# Patient Record
Sex: MALE | Race: WHITE | NOT HISPANIC OR LATINO | Employment: OTHER | URBAN - METROPOLITAN AREA
[De-identification: names, ages, dates, MRNs, and addresses within clinical notes are randomized per-mention and may not be internally consistent; named-entity substitution may affect disease eponyms.]

---

## 2017-05-24 ENCOUNTER — HISTORICAL (OUTPATIENT)
Dept: LAB | Facility: HOSPITAL | Age: 62
End: 2017-05-24

## 2017-05-24 LAB
BUN SERPL-MCNC: 16 MG/DL (ref 10–20)
CALCIUM SERPL-MCNC: 9.2 MG/DL (ref 8–10.5)
CHLORIDE SERPL-SCNC: 107 MMOL/L (ref 100–108)
CO2 SERPL-SCNC: 27 MMOL/L (ref 21–35)
CREAT SERPL-MCNC: 1.01 MG/DL (ref 0.7–1.3)
EST. AVERAGE GLUCOSE BLD GHB EST-MCNC: 128 MG/DL
GLUCOSE 1H P 100 G GLC PO SERPL-MCNC: 174 MG/DL
GLUCOSE 2H P 100 G GLC PO SERPL-MCNC: 123 MG/DL
GLUCOSE 30M P 100 G GLC PO SERPL-MCNC: 178 MG/DL
GLUCOSE SERPL-MCNC: 106 MG/DL (ref 75–116)
HBA1C MFR BLD: 6.1 % (ref 4.8–6)
POTASSIUM SERPL-SCNC: 4.6 MMOL/L (ref 3.6–5.2)
SODIUM SERPL-SCNC: 142 MMOL/L (ref 135–145)

## 2017-05-25 LAB — GLUCOSE 2H P GLC SERPL-MCNC: 123 MG/DL

## 2017-11-17 ENCOUNTER — HISTORICAL (OUTPATIENT)
Dept: LAB | Facility: HOSPITAL | Age: 62
End: 2017-11-17

## 2017-11-17 LAB
ALBUMIN SERPL-MCNC: 3.8 GM/DL (ref 3.4–5)
ALBUMIN/GLOB SERPL: 1.2 RATIO
ALP SERPL-CCNC: 112 UNIT/L (ref 30–113)
ALT SERPL-CCNC: 38 UNIT/L (ref 10–45)
AST SERPL-CCNC: 28 UNIT/L (ref 15–37)
BILIRUB SERPL-MCNC: 0.3 MG/DL (ref 0.1–0.9)
BILIRUBIN DIRECT+TOT PNL SERPL-MCNC: 0.1 MG/DL (ref 0–0.3)
BILIRUBIN DIRECT+TOT PNL SERPL-MCNC: 0.2 MG/DL
BUN SERPL-MCNC: 16 MG/DL (ref 10–20)
CALCIUM SERPL-MCNC: 8.9 MG/DL (ref 8–10.5)
CHLORIDE SERPL-SCNC: 106 MMOL/L (ref 100–108)
CHOLEST SERPL-MCNC: 160 MG/DL (ref 140–200)
CHOLEST/HDLC SERPL: 2 MG/DL (ref 0–8)
CO2 SERPL-SCNC: 26 MMOL/L (ref 21–35)
CREAT SERPL-MCNC: 1.01 MG/DL (ref 0.7–1.3)
EST. AVERAGE GLUCOSE BLD GHB EST-MCNC: 131 MG/DL
GLOBULIN SER-MCNC: 3.3 GM/DL
GLUCOSE SERPL-MCNC: 112 MG/DL (ref 75–116)
HBA1C MFR BLD: 6.2 % (ref 4.8–6)
HDLC SERPL-MCNC: 64 MG/DL (ref 35–59)
LDLC SERPL CALC-MCNC: 86 MG/DL (ref 100–129)
POTASSIUM SERPL-SCNC: 4.4 MMOL/L (ref 3.6–5.2)
PROT SERPL-MCNC: 7.1 GM/DL (ref 6.4–8.2)
SODIUM SERPL-SCNC: 140 MMOL/L (ref 135–145)
TRIGL SERPL-MCNC: 101 MG/DL (ref 35–150)
URATE SERPL-MCNC: 4.6 MG/DL (ref 2.6–7.2)
VLDLC SERPL CALC-MCNC: 20 MG/DL

## 2018-02-15 ENCOUNTER — HISTORICAL (OUTPATIENT)
Dept: RADIOLOGY | Facility: HOSPITAL | Age: 63
End: 2018-02-15

## 2019-08-02 ENCOUNTER — HISTORICAL (OUTPATIENT)
Dept: RADIOLOGY | Facility: HOSPITAL | Age: 64
End: 2019-08-02

## 2019-11-26 ENCOUNTER — HISTORICAL (OUTPATIENT)
Dept: LAB | Facility: HOSPITAL | Age: 64
End: 2019-11-26

## 2019-11-26 LAB
ALBUMIN SERPL-MCNC: 3.9 GM/DL (ref 3.4–5)
ALBUMIN/GLOB SERPL: 1.1 RATIO
ALP SERPL-CCNC: 87 UNIT/L (ref 30–113)
ALT SERPL-CCNC: 31 UNIT/L (ref 10–45)
AST SERPL-CCNC: 23 UNIT/L (ref 15–37)
BILIRUB SERPL-MCNC: 0.4 MG/DL (ref 0.1–0.9)
BILIRUBIN DIRECT+TOT PNL SERPL-MCNC: 0.1 MG/DL (ref 0–0.3)
BILIRUBIN DIRECT+TOT PNL SERPL-MCNC: 0.3 MG/DL
BUN SERPL-MCNC: 14 MG/DL (ref 10–20)
CALCIUM SERPL-MCNC: 9.4 MG/DL (ref 8–10.5)
CHLORIDE SERPL-SCNC: 106 MMOL/L (ref 100–108)
CHOLEST SERPL-MCNC: 180 MG/DL (ref 140–200)
CHOLEST/HDLC SERPL: 2 MG/DL (ref 0–8)
CO2 SERPL-SCNC: 30 MMOL/L (ref 21–35)
CREAT SERPL-MCNC: 0.96 MG/DL (ref 0.7–1.3)
EST. AVERAGE GLUCOSE BLD GHB EST-MCNC: 134 MG/DL
GLOBULIN SER-MCNC: 3.4 GM/DL
GLUCOSE SERPL-MCNC: 103 MG/DL (ref 75–116)
HBA1C MFR BLD: 6.3 % (ref 4.8–6)
HDLC SERPL-MCNC: 87 MG/DL (ref 35–59)
LDLC SERPL CALC-MCNC: 88 MG/DL (ref 100–129)
POTASSIUM SERPL-SCNC: 4.4 MMOL/L (ref 3.6–5.2)
PROT SERPL-MCNC: 7.3 GM/DL (ref 6.4–8.2)
SODIUM SERPL-SCNC: 142 MMOL/L (ref 135–145)
TRIGL SERPL-MCNC: 93 MG/DL (ref 35–150)
TSH SERPL-ACNC: 1.41 MIU/ML (ref 0.36–3.74)
URATE SERPL-MCNC: 4.3 MG/DL (ref 2.6–7.2)
VLDLC SERPL CALC-MCNC: 19 MG/DL

## 2019-12-30 ENCOUNTER — HISTORICAL (OUTPATIENT)
Dept: RADIOLOGY | Facility: HOSPITAL | Age: 64
End: 2019-12-30

## 2020-02-20 ENCOUNTER — HISTORICAL (OUTPATIENT)
Dept: RADIOLOGY | Facility: HOSPITAL | Age: 65
End: 2020-02-20

## 2020-05-21 ENCOUNTER — HISTORICAL (OUTPATIENT)
Dept: LAB | Facility: HOSPITAL | Age: 65
End: 2020-05-21

## 2020-05-21 LAB
ABS NEUT (OLG): 3.9 X10(3)/MCL (ref 1.5–6.9)
BASOPHILS # BLD AUTO: 0 X10(3)/MCL (ref 0–0.1)
BASOPHILS NFR BLD AUTO: 0 % (ref 0–1)
EOSINOPHIL # BLD AUTO: 0.3 X10(3)/MCL (ref 0–0.6)
EOSINOPHIL NFR BLD AUTO: 4 % (ref 0–5)
ERYTHROCYTE [DISTWIDTH] IN BLOOD BY AUTOMATED COUNT: 14.4 % (ref 11.5–17)
HCT VFR BLD AUTO: 37.8 % (ref 42–52)
HGB BLD-MCNC: 12.1 GM/DL (ref 14–18)
IMM GRANULOCYTES # BLD AUTO: 0.03 10*3/UL (ref 0–0.02)
IMM GRANULOCYTES NFR BLD AUTO: 0.4 % (ref 0–0.43)
LYMPHOCYTES # BLD AUTO: 2.2 X10(3)/MCL (ref 0.5–4.1)
LYMPHOCYTES NFR BLD AUTO: 31 % (ref 15–40)
MCH RBC QN AUTO: 31 PG (ref 27–34)
MCHC RBC AUTO-ENTMCNC: 32 GM/DL (ref 31–36)
MCV RBC AUTO: 98 FL (ref 80–99)
MONOCYTES # BLD AUTO: 0.6 X10(3)/MCL (ref 0–1.1)
MONOCYTES NFR BLD AUTO: 9 % (ref 4–12)
NEUTROPHILS # BLD AUTO: 3.9 X10(3)/MCL (ref 1.5–6.9)
NEUTROPHILS NFR BLD AUTO: 55 % (ref 43–75)
PLATELET # BLD AUTO: 274 X10(3)/MCL (ref 140–400)
PMV BLD AUTO: 10.6 FL (ref 6.8–10)
RBC # BLD AUTO: 3.87 X10(6)/MCL (ref 4.7–6.1)
WBC # SPEC AUTO: 7 X10(3)/MCL (ref 4.5–11.5)

## 2020-07-06 ENCOUNTER — HISTORICAL (OUTPATIENT)
Dept: LAB | Facility: HOSPITAL | Age: 65
End: 2020-07-06

## 2020-08-20 ENCOUNTER — HISTORICAL (OUTPATIENT)
Dept: RESPIRATORY THERAPY | Facility: HOSPITAL | Age: 65
End: 2020-08-20

## 2020-09-11 ENCOUNTER — HISTORICAL (OUTPATIENT)
Dept: LAB | Facility: HOSPITAL | Age: 65
End: 2020-09-11

## 2020-09-11 LAB
ABS NEUT (OLG): 3.4 X10(3)/MCL (ref 1.5–6.9)
BASOPHILS # BLD AUTO: 0 X10(3)/MCL (ref 0–0.1)
BASOPHILS NFR BLD AUTO: 1 % (ref 0–1)
EOSINOPHIL # BLD AUTO: 0.6 X10(3)/MCL (ref 0–0.6)
EOSINOPHIL NFR BLD AUTO: 9 % (ref 0–5)
ERYTHROCYTE [DISTWIDTH] IN BLOOD BY AUTOMATED COUNT: 13.7 % (ref 11.5–17)
HCT VFR BLD AUTO: 42.6 % (ref 42–52)
HGB BLD-MCNC: 14.3 GM/DL (ref 14–18)
IMM GRANULOCYTES # BLD AUTO: 0.02 10*3/UL (ref 0–0.02)
IMM GRANULOCYTES NFR BLD AUTO: 0.3 % (ref 0–0.43)
LYMPHOCYTES # BLD AUTO: 1.9 X10(3)/MCL (ref 0.5–4.1)
LYMPHOCYTES NFR BLD AUTO: 29 % (ref 15–40)
MCH RBC QN AUTO: 31 PG (ref 27–34)
MCHC RBC AUTO-ENTMCNC: 34 GM/DL (ref 31–36)
MCV RBC AUTO: 92 FL (ref 80–99)
MONOCYTES # BLD AUTO: 0.5 X10(3)/MCL (ref 0–1.1)
MONOCYTES NFR BLD AUTO: 8 % (ref 4–12)
NEUTROPHILS # BLD AUTO: 3.4 X10(3)/MCL (ref 1.5–6.9)
NEUTROPHILS NFR BLD AUTO: 53 % (ref 43–75)
PLATELET # BLD AUTO: 251 X10(3)/MCL (ref 140–400)
PMV BLD AUTO: 10.8 FL (ref 6.8–10)
RBC # BLD AUTO: 4.65 X10(6)/MCL (ref 4.7–6.1)
WBC # SPEC AUTO: 6.5 X10(3)/MCL (ref 4.5–11.5)

## 2020-11-24 ENCOUNTER — HISTORICAL (OUTPATIENT)
Dept: LAB | Facility: HOSPITAL | Age: 65
End: 2020-11-24

## 2020-11-24 LAB
ALBUMIN SERPL-MCNC: 4 GM/DL (ref 3.4–4.8)
ALBUMIN/GLOB SERPL: 1.2 RATIO (ref 1.1–2)
ALP SERPL-CCNC: 104 UNIT/L (ref 40–150)
ALT SERPL-CCNC: 25 UNIT/L (ref 0–55)
AST SERPL-CCNC: 23 UNIT/L (ref 5–34)
BILIRUB SERPL-MCNC: 0.7 MG/DL
BILIRUBIN DIRECT+TOT PNL SERPL-MCNC: 0.2 MG/DL (ref 0–0.5)
BILIRUBIN DIRECT+TOT PNL SERPL-MCNC: 0.5 MG/DL (ref 0–0.8)
BUN SERPL-MCNC: 17 MG/DL (ref 8.4–25.7)
CALCIUM SERPL-MCNC: 9.6 MG/DL (ref 8.8–10)
CHLORIDE SERPL-SCNC: 104 MMOL/L (ref 98–107)
CHOLEST SERPL-MCNC: 210 MG/DL
CHOLEST/HDLC SERPL: 3 {RATIO} (ref 0–5)
CO2 SERPL-SCNC: 30 MMOL/L (ref 23–31)
CREAT SERPL-MCNC: 0.9 MG/DL (ref 0.73–1.18)
EST. AVERAGE GLUCOSE BLD GHB EST-MCNC: 122.6 MG/DL
GLOBULIN SER-MCNC: 3.4 GM/DL (ref 2.4–3.5)
GLUCOSE SERPL-MCNC: 107 MG/DL (ref 82–115)
HBA1C MFR BLD: 5.9 %
HDLC SERPL-MCNC: 65 MG/DL (ref 35–60)
LDLC SERPL CALC-MCNC: 111 MG/DL (ref 50–140)
POTASSIUM SERPL-SCNC: 4.7 MMOL/L (ref 3.5–5.1)
PROT SERPL-MCNC: 7.4 GM/DL (ref 5.8–7.6)
SODIUM SERPL-SCNC: 143 MMOL/L (ref 136–145)
TRIGL SERPL-MCNC: 171 MG/DL (ref 34–140)
TSH SERPL-ACNC: 0.58 UIU/ML (ref 0.35–4.94)
URATE SERPL-MCNC: 5.1 MG/DL (ref 3.5–7.2)
VLDLC SERPL CALC-MCNC: 34 MG/DL

## 2021-09-24 ENCOUNTER — HISTORICAL (OUTPATIENT)
Dept: LAB | Facility: HOSPITAL | Age: 66
End: 2021-09-24

## 2021-09-24 LAB — SARS-COV-2 AG RESP QL IA.RAPID: NOT DETECTED

## 2021-10-07 ENCOUNTER — HISTORICAL (OUTPATIENT)
Dept: LAB | Facility: HOSPITAL | Age: 66
End: 2021-10-07

## 2021-10-07 LAB
ABS NEUT (OLG): 4.5 X10(3)/MCL (ref 1.5–6.9)
BASOPHILS # BLD AUTO: 0 X10(3)/MCL (ref 0–0.1)
BASOPHILS NFR BLD AUTO: 0 % (ref 0–1)
EOSINOPHIL # BLD AUTO: 0 X10(3)/MCL (ref 0–0.6)
EOSINOPHIL NFR BLD AUTO: 0 % (ref 0–5)
ERYTHROCYTE [DISTWIDTH] IN BLOOD BY AUTOMATED COUNT: 13.8 % (ref 11.5–17)
HCT VFR BLD AUTO: 42.7 % (ref 42–52)
HGB BLD-MCNC: 14.1 GM/DL (ref 14–18)
IMM GRANULOCYTES # BLD AUTO: 0.03 10*3/UL (ref 0–0.02)
IMM GRANULOCYTES NFR BLD AUTO: 0.4 % (ref 0–0.43)
LYMPHOCYTES # BLD AUTO: 2.4 X10(3)/MCL (ref 0.5–4.1)
LYMPHOCYTES NFR BLD AUTO: 31 % (ref 15–40)
MCH RBC QN AUTO: 31 PG (ref 27–34)
MCHC RBC AUTO-ENTMCNC: 33 GM/DL (ref 31–36)
MCV RBC AUTO: 94 FL (ref 80–99)
MONOCYTES # BLD AUTO: 0.7 X10(3)/MCL (ref 0–1.1)
MONOCYTES NFR BLD AUTO: 9 % (ref 4–12)
NEUTROPHILS # BLD AUTO: 4.5 X10(3)/MCL (ref 1.5–6.9)
NEUTROPHILS NFR BLD AUTO: 59 % (ref 43–75)
PLATELET # BLD AUTO: 247 X10(3)/MCL (ref 140–400)
PMV BLD AUTO: 10.8 FL (ref 6.8–10)
RBC # BLD AUTO: 4.55 X10(6)/MCL (ref 4.7–6.1)
WBC # SPEC AUTO: 7.6 X10(3)/MCL (ref 4.5–11.5)

## 2021-12-03 ENCOUNTER — HISTORICAL (OUTPATIENT)
Dept: LAB | Facility: HOSPITAL | Age: 66
End: 2021-12-03

## 2021-12-03 LAB
ALBUMIN SERPL-MCNC: 4 GM/DL (ref 3.4–4.8)
ALBUMIN/GLOB SERPL: 1.4 RATIO (ref 1.1–2)
ALP SERPL-CCNC: 109 UNIT/L (ref 40–150)
ALT SERPL-CCNC: 28 UNIT/L (ref 0–55)
AST SERPL-CCNC: 26 UNIT/L (ref 5–34)
BILIRUB SERPL-MCNC: 0.4 MG/DL
BILIRUBIN DIRECT+TOT PNL SERPL-MCNC: 0.2 MG/DL (ref 0–0.5)
BILIRUBIN DIRECT+TOT PNL SERPL-MCNC: 0.2 MG/DL (ref 0–0.8)
BUN SERPL-MCNC: 18 MG/DL (ref 8.4–25.7)
CALCIUM SERPL-MCNC: 9.3 MG/DL (ref 8.7–10.5)
CHLORIDE SERPL-SCNC: 107 MMOL/L (ref 98–107)
CHOLEST SERPL-MCNC: 180 MG/DL
CHOLEST/HDLC SERPL: 3 {RATIO} (ref 0–5)
CO2 SERPL-SCNC: 27 MMOL/L (ref 23–31)
CREAT SERPL-MCNC: 0.93 MG/DL (ref 0.73–1.18)
EST. AVERAGE GLUCOSE BLD GHB EST-MCNC: 125.5 MG/DL
GLOBULIN SER-MCNC: 2.8 GM/DL (ref 2.4–3.5)
GLUCOSE SERPL-MCNC: 119 MG/DL (ref 82–115)
HBA1C MFR BLD: 6 %
HDLC SERPL-MCNC: 54 MG/DL (ref 35–60)
LDLC SERPL CALC-MCNC: 106 MG/DL (ref 50–140)
POTASSIUM SERPL-SCNC: 5.2 MMOL/L (ref 3.5–5.1)
PROT SERPL-MCNC: 6.8 GM/DL (ref 5.8–7.6)
SODIUM SERPL-SCNC: 141 MMOL/L (ref 136–145)
TRIGL SERPL-MCNC: 102 MG/DL (ref 34–140)
URATE SERPL-MCNC: 5 MG/DL (ref 3.5–7.2)
VLDLC SERPL CALC-MCNC: 20 MG/DL

## 2022-02-08 ENCOUNTER — HISTORICAL (OUTPATIENT)
Dept: INFECTIOUS DISEASES | Facility: HOSPITAL | Age: 67
End: 2022-02-08

## 2022-03-30 ENCOUNTER — HISTORICAL (OUTPATIENT)
Dept: ADMINISTRATIVE | Facility: HOSPITAL | Age: 67
End: 2022-03-30

## 2022-03-30 ENCOUNTER — HISTORICAL (OUTPATIENT)
Dept: LAB | Facility: HOSPITAL | Age: 67
End: 2022-03-30

## 2022-03-30 LAB
CRP SERPL-MCNC: 0.35 MG/L
ERYTHROCYTE [SEDIMENTATION RATE] IN BLOOD: 45 MM/H (ref 0–20)

## 2022-09-21 ENCOUNTER — HOSPITAL ENCOUNTER (OUTPATIENT)
Dept: RADIOLOGY | Facility: HOSPITAL | Age: 67
Discharge: HOME | End: 2022-09-21
Attending: FAMILY MEDICINE
Payer: COMMERCIAL

## 2022-09-21 DIAGNOSIS — R05.9 COUGH: ICD-10-CM

## 2022-09-21 PROCEDURE — 71046 X-RAY EXAM CHEST 2 VIEWS: CPT | Mod: TC

## 2022-10-26 ENCOUNTER — HOSPITAL ENCOUNTER (OUTPATIENT)
Dept: RADIOLOGY | Facility: HOSPITAL | Age: 67
Discharge: HOME | End: 2022-10-26
Attending: FAMILY MEDICINE
Payer: COMMERCIAL

## 2022-10-26 DIAGNOSIS — R93.89 ABNORMAL FINDINGS ON IMAGING TEST: ICD-10-CM

## 2022-10-26 PROCEDURE — 71047 X-RAY EXAM CHEST 3 VIEWS: CPT | Mod: TC

## 2022-11-02 DIAGNOSIS — R93.89 ABNORMAL RADIOLOGICAL FINDINGS IN SKIN AND SUBCUTANEOUS TISSUE: Primary | ICD-10-CM

## 2022-11-11 ENCOUNTER — HOSPITAL ENCOUNTER (OUTPATIENT)
Dept: RADIOLOGY | Facility: HOSPITAL | Age: 67
Discharge: HOME | End: 2022-11-11
Attending: FAMILY MEDICINE
Payer: COMMERCIAL

## 2022-11-11 DIAGNOSIS — R93.89 ABNORMAL RADIOLOGICAL FINDINGS IN SKIN AND SUBCUTANEOUS TISSUE: ICD-10-CM

## 2022-11-11 LAB
CREAT SERPL-MCNC: 1 MG/DL (ref 0.5–1.4)
SAMPLE: NORMAL

## 2022-11-11 PROCEDURE — 71260 CT THORAX DX C+: CPT | Mod: TC

## 2022-11-11 PROCEDURE — 25500020 PHARM REV CODE 255: Performed by: FAMILY MEDICINE

## 2022-11-11 RX ADMIN — IOPAMIDOL 100 ML: 755 INJECTION, SOLUTION INTRAVENOUS at 09:11

## 2022-11-17 DIAGNOSIS — R93.89 ABNORMAL RADIOLOGICAL FINDINGS IN SKIN AND SUBCUTANEOUS TISSUE: Primary | ICD-10-CM

## 2022-11-21 ENCOUNTER — HOSPITAL ENCOUNTER (OUTPATIENT)
Dept: RADIOLOGY | Facility: HOSPITAL | Age: 67
Discharge: HOME | End: 2022-11-21
Attending: FAMILY MEDICINE
Payer: COMMERCIAL

## 2022-11-21 DIAGNOSIS — R93.89 ABNORMAL RADIOLOGICAL FINDINGS IN SKIN AND SUBCUTANEOUS TISSUE: ICD-10-CM

## 2022-11-21 PROCEDURE — 76536 US EXAM OF HEAD AND NECK: CPT | Mod: TC

## 2022-11-23 ENCOUNTER — LAB VISIT (OUTPATIENT)
Dept: LAB | Facility: HOSPITAL | Age: 67
End: 2022-11-23
Attending: FAMILY MEDICINE
Payer: COMMERCIAL

## 2022-11-23 DIAGNOSIS — E04.1 NONTOXIC UNINODULAR GOITER: Primary | ICD-10-CM

## 2022-11-23 LAB
T4 FREE SERPL-MCNC: 1.08 NG/DL (ref 0.7–1.48)
TSH SERPL-ACNC: 0.85 UIU/ML (ref 0.35–4.94)

## 2022-11-23 PROCEDURE — 84443 ASSAY THYROID STIM HORMONE: CPT

## 2022-11-23 PROCEDURE — 36415 COLL VENOUS BLD VENIPUNCTURE: CPT

## 2022-11-23 PROCEDURE — 84439 ASSAY OF FREE THYROXINE: CPT

## 2022-12-09 ENCOUNTER — LAB VISIT (OUTPATIENT)
Dept: LAB | Facility: HOSPITAL | Age: 67
End: 2022-12-09
Attending: FAMILY MEDICINE
Payer: COMMERCIAL

## 2022-12-09 DIAGNOSIS — M10.9 GOUT, UNSPECIFIED CAUSE, UNSPECIFIED CHRONICITY, UNSPECIFIED SITE: ICD-10-CM

## 2022-12-09 DIAGNOSIS — R73.01 IMPAIRED FASTING GLUCOSE: ICD-10-CM

## 2022-12-09 DIAGNOSIS — I10 ESSENTIAL HYPERTENSION, MALIGNANT: Primary | ICD-10-CM

## 2022-12-09 LAB
ALBUMIN SERPL-MCNC: 4.2 GM/DL (ref 3.4–4.8)
ALBUMIN/GLOB SERPL: 1.6 RATIO (ref 1.1–2)
ALP SERPL-CCNC: 99 UNIT/L (ref 40–150)
ALT SERPL-CCNC: 19 UNIT/L (ref 0–55)
AST SERPL-CCNC: 23 UNIT/L (ref 5–34)
BILIRUBIN DIRECT+TOT PNL SERPL-MCNC: 0.7 MG/DL
BUN SERPL-MCNC: 13 MG/DL (ref 8.4–25.7)
CALCIUM SERPL-MCNC: 9.6 MG/DL (ref 8.8–10)
CHLORIDE SERPL-SCNC: 107 MMOL/L (ref 98–107)
CHOLEST SERPL-MCNC: 186 MG/DL
CHOLEST/HDLC SERPL: 3 {RATIO} (ref 0–5)
CO2 SERPL-SCNC: 26 MMOL/L (ref 23–31)
CREAT SERPL-MCNC: 1.06 MG/DL (ref 0.73–1.18)
EST. AVERAGE GLUCOSE BLD GHB EST-MCNC: 119.8 MG/DL
GFR SERPLBLD CREATININE-BSD FMLA CKD-EPI: >60 MLS/MIN/1.73/M2
GLOBULIN SER-MCNC: 2.7 GM/DL (ref 2.4–3.5)
GLUCOSE SERPL-MCNC: 115 MG/DL (ref 82–115)
HBA1C MFR BLD: 5.8 %
HDLC SERPL-MCNC: 69 MG/DL (ref 35–60)
LDLC SERPL CALC-MCNC: 98 MG/DL (ref 50–140)
POTASSIUM SERPL-SCNC: 4.8 MMOL/L (ref 3.5–5.1)
PROT SERPL-MCNC: 6.9 GM/DL (ref 5.8–7.6)
SODIUM SERPL-SCNC: 141 MMOL/L (ref 136–145)
TRIGL SERPL-MCNC: 93 MG/DL (ref 34–140)
TSH SERPL-ACNC: 0.54 UIU/ML (ref 0.35–4.94)
URATE SERPL-MCNC: 5 MG/DL (ref 3.5–7.2)
VLDLC SERPL CALC-MCNC: 19 MG/DL

## 2022-12-09 PROCEDURE — 84550 ASSAY OF BLOOD/URIC ACID: CPT

## 2022-12-09 PROCEDURE — 80061 LIPID PANEL: CPT

## 2022-12-09 PROCEDURE — 36415 COLL VENOUS BLD VENIPUNCTURE: CPT

## 2022-12-09 PROCEDURE — 80053 COMPREHEN METABOLIC PANEL: CPT

## 2022-12-09 PROCEDURE — 84443 ASSAY THYROID STIM HORMONE: CPT

## 2022-12-09 PROCEDURE — 83036 HEMOGLOBIN GLYCOSYLATED A1C: CPT

## 2023-03-31 ENCOUNTER — HOSPITAL ENCOUNTER (EMERGENCY)
Facility: HOSPITAL | Age: 68
Discharge: HOME | End: 2023-03-31
Attending: EMERGENCY MEDICINE
Payer: COMMERCIAL

## 2023-03-31 VITALS
TEMPERATURE: 99 F | SYSTOLIC BLOOD PRESSURE: 115 MMHG | HEART RATE: 96 BPM | WEIGHT: 187 LBS | HEIGHT: 64 IN | RESPIRATION RATE: 23 BRPM | OXYGEN SATURATION: 92 % | DIASTOLIC BLOOD PRESSURE: 75 MMHG | BODY MASS INDEX: 31.92 KG/M2

## 2023-03-31 DIAGNOSIS — Z86.79 HISTORY OF HYPERTENSION: ICD-10-CM

## 2023-03-31 DIAGNOSIS — R06.02 SHORTNESS OF BREATH: ICD-10-CM

## 2023-03-31 DIAGNOSIS — J44.1 COPD EXACERBATION: ICD-10-CM

## 2023-03-31 DIAGNOSIS — J40 SINOBRONCHITIS: Primary | ICD-10-CM

## 2023-03-31 DIAGNOSIS — J32.9 SINOBRONCHITIS: Primary | ICD-10-CM

## 2023-03-31 LAB
ALBUMIN SERPL-MCNC: 3.8 G/DL (ref 3.4–4.8)
ALBUMIN/GLOB SERPL: 1.2 RATIO (ref 1.1–2)
ALP SERPL-CCNC: 107 UNIT/L (ref 40–150)
ALT SERPL-CCNC: 20 UNIT/L (ref 0–55)
AST SERPL-CCNC: 22 UNIT/L (ref 5–34)
BASOPHILS # BLD AUTO: 0.01 X10(3)/MCL (ref 0–0.2)
BASOPHILS NFR BLD AUTO: 0.1 %
BILIRUBIN DIRECT+TOT PNL SERPL-MCNC: 0.6 MG/DL
BNP BLD-MCNC: 13.1 PG/ML
BUN SERPL-MCNC: 9 MG/DL (ref 8.4–25.7)
CALCIUM SERPL-MCNC: 9.4 MG/DL (ref 8.8–10)
CHLORIDE SERPL-SCNC: 106 MMOL/L (ref 98–107)
CO2 SERPL-SCNC: 25 MMOL/L (ref 23–31)
CREAT SERPL-MCNC: 0.94 MG/DL (ref 0.73–1.18)
EOSINOPHIL # BLD AUTO: 0 X10(3)/MCL (ref 0–0.9)
EOSINOPHIL NFR BLD AUTO: 0 %
ERYTHROCYTE [DISTWIDTH] IN BLOOD BY AUTOMATED COUNT: 13.2 % (ref 11.5–17)
FLUAV AG UPPER RESP QL IA.RAPID: NOT DETECTED
FLUBV AG UPPER RESP QL IA.RAPID: NOT DETECTED
GFR SERPLBLD CREATININE-BSD FMLA CKD-EPI: >60 MLS/MIN/1.73/M2
GLOBULIN SER-MCNC: 3.3 GM/DL (ref 2.4–3.5)
GLUCOSE SERPL-MCNC: 107 MG/DL (ref 82–115)
HCT VFR BLD AUTO: 40.4 % (ref 42–52)
HGB BLD-MCNC: 13.4 G/DL (ref 14–18)
IMM GRANULOCYTES # BLD AUTO: 0.03 X10(3)/MCL (ref 0–0.04)
IMM GRANULOCYTES NFR BLD AUTO: 0.3 %
INR BLD: 0.97 (ref 0–1.3)
LACTATE SERPL-SCNC: 1 MMOL/L (ref 0.5–2.2)
LYMPHOCYTES # BLD AUTO: 1.39 X10(3)/MCL (ref 0.6–4.6)
LYMPHOCYTES NFR BLD AUTO: 13.1 %
MCH RBC QN AUTO: 31 PG (ref 27–31)
MCHC RBC AUTO-ENTMCNC: 33.2 G/DL (ref 33–36)
MCV RBC AUTO: 93.5 FL (ref 80–94)
MONOCYTES # BLD AUTO: 0.99 X10(3)/MCL (ref 0.1–1.3)
MONOCYTES NFR BLD AUTO: 9.4 %
NEUTROPHILS # BLD AUTO: 8.16 X10(3)/MCL (ref 2.1–9.2)
NEUTROPHILS NFR BLD AUTO: 77.1 %
PLATELET # BLD AUTO: 197 X10(3)/MCL (ref 130–400)
PMV BLD AUTO: 11 FL (ref 7.4–10.4)
POTASSIUM SERPL-SCNC: 3.9 MMOL/L (ref 3.5–5.1)
PROT SERPL-MCNC: 7.1 GM/DL (ref 5.8–7.6)
PROTHROMBIN TIME: 13.1 SECONDS (ref 12.5–14.5)
RBC # BLD AUTO: 4.32 X10(6)/MCL (ref 4.7–6.1)
SARS-COV-2 RNA RESP QL NAA+PROBE: NOT DETECTED
SODIUM SERPL-SCNC: 140 MMOL/L (ref 136–145)
TROPONIN I SERPL-MCNC: <0.01 NG/ML (ref 0–0.04)
WBC # SPEC AUTO: 10.6 X10(3)/MCL (ref 4.5–11.5)

## 2023-03-31 PROCEDURE — 94640 AIRWAY INHALATION TREATMENT: CPT

## 2023-03-31 PROCEDURE — 84484 ASSAY OF TROPONIN QUANT: CPT | Performed by: EMERGENCY MEDICINE

## 2023-03-31 PROCEDURE — 85025 COMPLETE CBC W/AUTO DIFF WBC: CPT | Performed by: EMERGENCY MEDICINE

## 2023-03-31 PROCEDURE — 93005 ELECTROCARDIOGRAM TRACING: CPT

## 2023-03-31 PROCEDURE — 83880 ASSAY OF NATRIURETIC PEPTIDE: CPT | Performed by: EMERGENCY MEDICINE

## 2023-03-31 PROCEDURE — 25000003 PHARM REV CODE 250: Performed by: EMERGENCY MEDICINE

## 2023-03-31 PROCEDURE — 25000242 PHARM REV CODE 250 ALT 637 W/ HCPCS: Performed by: EMERGENCY MEDICINE

## 2023-03-31 PROCEDURE — 63600175 PHARM REV CODE 636 W HCPCS: Performed by: EMERGENCY MEDICINE

## 2023-03-31 PROCEDURE — 27000221 HC OXYGEN, UP TO 24 HOURS

## 2023-03-31 PROCEDURE — 96374 THER/PROPH/DIAG INJ IV PUSH: CPT

## 2023-03-31 PROCEDURE — 83605 ASSAY OF LACTIC ACID: CPT | Performed by: EMERGENCY MEDICINE

## 2023-03-31 PROCEDURE — 93010 EKG 12-LEAD: ICD-10-PCS | Mod: ,,, | Performed by: INTERNAL MEDICINE

## 2023-03-31 PROCEDURE — 94761 N-INVAS EAR/PLS OXIMETRY MLT: CPT

## 2023-03-31 PROCEDURE — 96361 HYDRATE IV INFUSION ADD-ON: CPT

## 2023-03-31 PROCEDURE — 99285 EMERGENCY DEPT VISIT HI MDM: CPT | Mod: 25

## 2023-03-31 PROCEDURE — 87077 CULTURE AEROBIC IDENTIFY: CPT | Performed by: EMERGENCY MEDICINE

## 2023-03-31 PROCEDURE — 85610 PROTHROMBIN TIME: CPT | Performed by: EMERGENCY MEDICINE

## 2023-03-31 PROCEDURE — 93010 ELECTROCARDIOGRAM REPORT: CPT | Mod: ,,, | Performed by: INTERNAL MEDICINE

## 2023-03-31 PROCEDURE — 0240U COVID/FLU A&B PCR: CPT | Performed by: EMERGENCY MEDICINE

## 2023-03-31 PROCEDURE — 80053 COMPREHEN METABOLIC PANEL: CPT | Performed by: EMERGENCY MEDICINE

## 2023-03-31 RX ORDER — CEFDINIR 300 MG/1
300 CAPSULE ORAL EVERY 12 HOURS
Status: DISCONTINUED | OUTPATIENT
Start: 2023-03-31 | End: 2023-03-31

## 2023-03-31 RX ORDER — SODIUM CHLORIDE 9 MG/ML
1000 INJECTION, SOLUTION INTRAVENOUS
Status: COMPLETED | OUTPATIENT
Start: 2023-03-31 | End: 2023-03-31

## 2023-03-31 RX ORDER — METHYLPREDNISOLONE SOD SUCC 125 MG
125 VIAL (EA) INJECTION
Status: COMPLETED | OUTPATIENT
Start: 2023-03-31 | End: 2023-03-31

## 2023-03-31 RX ORDER — PREDNISONE 20 MG/1
40 TABLET ORAL DAILY
Qty: 8 TABLET | Refills: 0 | Status: SHIPPED | OUTPATIENT
Start: 2023-03-31 | End: 2023-04-04

## 2023-03-31 RX ORDER — CEFDINIR 300 MG/1
300 CAPSULE ORAL 2 TIMES DAILY
Qty: 20 CAPSULE | Refills: 0 | Status: SHIPPED | OUTPATIENT
Start: 2023-03-31 | End: 2023-04-10

## 2023-03-31 RX ORDER — ALBUTEROL SULFATE 0.83 MG/ML
2.5 SOLUTION RESPIRATORY (INHALATION)
Status: COMPLETED | OUTPATIENT
Start: 2023-03-31 | End: 2023-03-31

## 2023-03-31 RX ORDER — ACETAMINOPHEN 500 MG
1000 TABLET ORAL
Status: COMPLETED | OUTPATIENT
Start: 2023-03-31 | End: 2023-03-31

## 2023-03-31 RX ORDER — CEFDINIR 300 MG/1
300 CAPSULE ORAL ONCE
Status: COMPLETED | OUTPATIENT
Start: 2023-03-31 | End: 2023-03-31

## 2023-03-31 RX ADMIN — METHYLPREDNISOLONE SODIUM SUCCINATE 125 MG: 125 INJECTION, POWDER, FOR SOLUTION INTRAMUSCULAR; INTRAVENOUS at 01:03

## 2023-03-31 RX ADMIN — ACETAMINOPHEN 1000 MG: 500 TABLET ORAL at 11:03

## 2023-03-31 RX ADMIN — SODIUM CHLORIDE 1000 ML: 9 INJECTION, SOLUTION INTRAVENOUS at 11:03

## 2023-03-31 RX ADMIN — ALBUTEROL SULFATE 2.5 MG: 2.5 SOLUTION RESPIRATORY (INHALATION) at 10:03

## 2023-03-31 RX ADMIN — CEFDINIR 300 MG: 300 CAPSULE ORAL at 01:03

## 2023-03-31 NOTE — DISCHARGE INSTRUCTIONS
If your condition worsens do not hesitate to return at any time be watchful    Use your nebulizer take the device home with you use it up to every 4 hours for today and then every 6-8 hours    Return for any emergency problem    Tylenol or Advil for discomfort drink lots of water

## 2023-03-31 NOTE — ED PROVIDER NOTES
Encounter Date: 3/31/2023       History     Chief Complaint   Patient presents with    Shortness of Breath     C/o SOB started yesterday     Mr. Howard presents emergency department complaining of shortness of breath progressive since Wednesday all the sudden it hit him his pain terrible this morning he says it is green drainage from his nose he is coughing up green stuff said last night he had 101.7 fever.  She has a history of COPD history of sinusitis in the past history of asthma in the past hypertension hypercholesterolemia borderline diabetes does take metformin.  Says is coughing up green sputum.  The Tylenol 230 this morning no fever here    Does not use tobacco alcohol or drugs never smoked he believes his COPD secondary to secondhand smoke.  Has had 2 COVID vaccines pneumonia flu is up-to-date tetanus greater than 5 years.  Sees Dr. Everett Muniz.  He is employed  lives at home with wife.  Mother  of brain cancer dad  emphysema complications.  Past surgical history left elbow.  Patient denies feeling bad otherwise.  This is sinuses feel very congested facial pain and the wheezing and shortness of breath    Review of patient's allergies indicates:   Allergen Reactions    Doxycycline     Penicillins      Past Medical History:   Diagnosis Date    Depression     Diabetes mellitus     Gout, unspecified     Hypercholesteremia     Hypertension     Obesity, unspecified      History reviewed. No pertinent surgical history.  History reviewed. No pertinent family history.  Social History     Tobacco Use    Smoking status: Never    Smokeless tobacco: Never     Review of Systems   Constitutional:  Positive for chills and fever.   HENT:  Positive for rhinorrhea (Green drainage from nose same green is coughing up), sinus pressure and sinus pain.    Eyes: Negative.    Respiratory:  Positive for cough, chest tightness, shortness of breath and wheezing.    Cardiovascular:  Negative for chest pain.    Gastrointestinal:  Negative for abdominal distention, nausea and vomiting.   Endocrine: Negative.    Musculoskeletal: Negative.    Skin: Negative.    Allergic/Immunologic: Negative.    Neurological: Negative.    Hematological: Negative.    Psychiatric/Behavioral: Negative.     All other systems reviewed and are negative.    Physical Exam     Initial Vitals [03/31/23 0918]   BP Pulse Resp Temp SpO2   133/82 (!) 118 20 99.1 °F (37.3 °C) (!) 87 %      MAP       --         Physical Exam    Nursing note and vitals reviewed.  Constitutional: He appears well-developed and well-nourished.   Short heavyset man appears to be in mild respiratory distress fully awake oriented speaking in long clear fluent sentences however   HENT:   Head: Normocephalic and atraumatic.   Mouth/Throat: Oropharynx is clear and moist.   Erythematous nasal and oral mucosa no gross exudate noted.  No stridor.  TMs are normal bilaterally   Eyes: EOM are normal. Pupils are equal, round, and reactive to light.   Neck: Neck supple.   Normal range of motion.  Cardiovascular:  Normal rate, regular rhythm and normal heart sounds.           Pulmonary/Chest: He has wheezes. He has rhonchi.   Abdominal: Abdomen is soft. Bowel sounds are normal. He exhibits no mass. There is no rebound.   Genitourinary:    Genitourinary Comments: No CVA tenderness     Musculoskeletal:         General: No tenderness or edema. Normal range of motion.      Cervical back: Normal range of motion and neck supple.     Lymphadenopathy:     He has no cervical adenopathy.   Neurological: He is alert and oriented to person, place, and time. He has normal strength and normal reflexes. GCS score is 15. GCS eye subscore is 4. GCS verbal subscore is 5. GCS motor subscore is 6.   Skin: Skin is warm and dry. Capillary refill takes 2 to 3 seconds. No rash noted. No pallor.   Psychiatric: He has a normal mood and affect. His behavior is normal. Judgment and thought content normal.       ED  Course   Procedures  Labs Reviewed   CBC WITH DIFFERENTIAL - Abnormal; Notable for the following components:       Result Value    RBC 4.32 (*)     Hgb 13.4 (*)     Hct 40.4 (*)     MPV 11.0 (*)     All other components within normal limits   COVID/FLU A&B PCR - Normal    Narrative:     The Xpert Xpress SARS-CoV-2/FLU/RSV plus is a rapid, multiplexed real-time PCR test intended for the simultaneous qualitative detection and differentiation of SARS-CoV-2, Influenza A, Influenza B, and respiratory syncytial virus (RSV) viral RNA in either nasopharyngeal swab or nasal swab specimens.         LACTIC ACID, PLASMA - Normal   PROTIME-INR - Normal   TROPONIN I - Normal   B-TYPE NATRIURETIC PEPTIDE - Normal   BLOOD CULTURE [facility]   BLOOD CULTURE [facility]   RESPIRATORY CULTURE ([facility])   CBC W/ AUTO DIFFERENTIAL    Narrative:     The following orders were created for panel order CBC Auto Differential.  Procedure                               Abnormality         Status                     ---------                               -----------         ------                     CBC with Differential[[id number]]        Abnormal            Final result                 Please view results for these tests on the individual orders.   COMPREHENSIVE METABOLIC PANEL     EKG Readings: (Independently Interpreted)   Initial Reading: No STEMI. Previous EKG Date: None found in system 1st EKG on our system today. Rhythm: Sinus Tachycardia. Heart Rate: 121. Axis: Left Axis Deviation.   Nine [age]-five 31 March sinus tach 121 beats per minute some left axis deviation -35 on the R axis.  Probably pulmonary disease type pattern no acute ST segment elevation.   ECG Results              EKG 12-lead (Final result)  Result time 03/31/23 12:15:37      Final result by Interface, Lab In [facility] (03/31/23 12:15:37)                   Narrative:    Test Reason : R06.02,    Vent. Rate : 121 BPM     Atrial Rate : 121 BPM     P-R Int : 162 ms          QRS Dur : 088  ms      QT Int : 302 ms       P-R-T Axes : 056 -35 045 degrees     QTc Int : 428 ms    Sinus tachycardia  Low voltage  Left axis deviation  Abnormal ECG  No previous ECGs available  Confirmed by Siddhartha Barney MD ([id number]) on 3/31/2023 12:15:30 PM    Referred By: ENEDELIA   SELF           Confirmed By:Siddhartha Barney MD                                  Imaging Results              X-Ray Chest PA And Lateral (Final result)  Result time 03/31/23 10:55:20      Final result by Tommy Lopez MD (03/31/23 10:55:20)                   Impression:      1. No active cardiopulmonary disease identified      Electronically signed by: Tommy Lopez  Date:    03/31/2023  Time:    10:55               Narrative:    EXAMINATION:  XR CHEST PA AND LATERAL    CLINICAL HISTORY:  , Shortness of breath.    COMPARISON:  10/26/2022    FINDINGS:  PA/AP and lateral views reveal the heart to be normal in size.  The trachea is to the right of midline.  There is elevation of the right hemidiaphragm.  Atelectasis and/or scarring is evident the left lung base.  No definite infiltrate or effusion is seen.  Degenerative changes are noted to the thoracic spine.  Bony structures are osteopenic.                                       Medications   albuterol nebulizer solution 2.5 mg (2.5 mg Nebulization Given 3/31/23 1052)   0.9%  NaCl infusion (0 mLs Intravenous Stopped 3/31/23 1306)   acetaminophen tablet 1,000 mg (1,000 mg Oral Given 3/31/23 1101)   methylPREDNISolone sodium succinate injection 125 mg (125 mg Intravenous Given 3/31/23 1304)   cefdinir capsule 300 mg (300 mg Oral Given 3/31/23 1301)     Medical Decision Making:   Initial Assessment:   Sinusitis with green drainage from his nose productive cough for the same green sputum wheezing and short of breath.  Sudden onset yesterday  Fever up to 101.7 yesterday.  Borderline diabetes mellitus complicating issues as well as sinuses disease in the past asthma and COPD  Differential Diagnosis:    Sinusitis, bronchitis, pneumonia, febrile illness acute, viral syndrome,  Clinical Tests:   Lab Tests: Ordered and Reviewed       <> Summary of Lab: Lab work returned remarkably well.  Negative flu COVID test.  Chest x-ray showed no pneumonia.  Breathing treatment helped him a lot.  He says breathing through the many mask helped a lot more than just the 1 you sucking through your mouth.  EKG was performed.  It was a sinus tachycardia of 121 per with left axis deviation no acute changes otherwise per radiologist's and myself  ED Management:  Patient looks so much better after the breathing treatment.  He tells me wants to go home he wants know what we are going to treat this with we talked about steroids he understands.  We talked about antibiotics he is had cephalosporins before without any issue we are going to use Omnicef we are going to give him the Solu-Medrol here and 4 days of prednisone did talk to him about his sugar running up although he just borderline to begin with  MDM  Problems addressed  Co-morbidities and/or factors adding to the complexity or risk for the patient:  COPD borderline diabetes hypertension no cardiac issues  Problems addressed:  Shortness of breath and sinus drainage green fever 101.7 at the house  Acute problem/illness or progression/exacerbation of chronic problem with potential threat to life/bodily dysfunction?:  Chronic hypertension.  And his borderline diabetes obesity   Differential diagnoses/problems considered: see above     Amount and/or Complexity of Data Reviewed  Independent Historian: spouse  (see above for summary)  External Data Reviewed: notes from clinic visits (see above for summary)  Risk and benefits of testing: discussed   Labs: Labs: ordered and reviewed  Radiology:Radiology: ordered and independent interpretation performed (see above or ED course)  ECG/medicine tests:Radiology: ordered and independent interpretation performed (see above or ED  course)  none    Risk  Prescription drug management   Parenteral controlled substances   Shared decision making     Critical Care  none                         Clinical Impression:   Final diagnoses:  [R06.02] Shortness of breath  [J32.9, J40] Sinobronchitis (Primary)  [J44.1] COPD exacerbation  [Z86.79] History of hypertension        ED Disposition Condition    Discharge Stable          ED Prescriptions       Medication Sig Dispense Start Date End Date Auth. Provider    cefdinir (OMNICEF) 300 MG capsule Take 1 capsule (300 mg total) by mouth 2 (two) times daily. for 10 days 20 capsule 3/31/2023 4/10/2023 Tommy Catalan MD    predniSONE (DELTASONE) 20 MG tablet Take 2 tablets (40 mg total) by mouth once daily. for 4 days 8 tablet 3/31/2023 4/4/2023 Tommy Catalan MD          Follow-up Information       Follow up With Specialties Details Why Contact Info    Tirso Muniz MD Family Medicine In 3 days As needed [address] [place] [id number]  [phone]               Tommy Catalan MD  03/31/23 0337

## 2023-04-04 LAB
BACTERIA BLD CULT: ABNORMAL
GRAM STN SPEC: ABNORMAL

## 2023-04-05 LAB — BACTERIA BLD CULT: NORMAL

## 2023-06-26 ENCOUNTER — LAB VISIT (OUTPATIENT)
Dept: LAB | Facility: HOSPITAL | Age: 68
End: 2023-06-26
Attending: FAMILY MEDICINE
Payer: COMMERCIAL

## 2023-06-26 DIAGNOSIS — I10 ESSENTIAL HYPERTENSION, MALIGNANT: Primary | ICD-10-CM

## 2023-06-26 LAB
ALBUMIN SERPL-MCNC: 4 G/DL (ref 3.4–4.8)
ALBUMIN/GLOB SERPL: 1.3 RATIO (ref 1.1–2)
ALP SERPL-CCNC: 104 UNIT/L (ref 40–150)
ALT SERPL-CCNC: 16 UNIT/L (ref 0–55)
AST SERPL-CCNC: 20 UNIT/L (ref 5–34)
BASOPHILS # BLD AUTO: 0.01 X10(3)/MCL
BASOPHILS NFR BLD AUTO: 0.1 %
BILIRUBIN DIRECT+TOT PNL SERPL-MCNC: 0.6 MG/DL
BUN SERPL-MCNC: 21 MG/DL (ref 8.4–25.7)
CALCIUM SERPL-MCNC: 9.6 MG/DL (ref 8.8–10)
CHLORIDE SERPL-SCNC: 107 MMOL/L (ref 98–107)
CHOLEST SERPL-MCNC: 187 MG/DL
CHOLEST/HDLC SERPL: 3 {RATIO} (ref 0–5)
CO2 SERPL-SCNC: 24 MMOL/L (ref 23–31)
CREAT SERPL-MCNC: 1.26 MG/DL (ref 0.73–1.18)
EOSINOPHIL # BLD AUTO: 0 X10(3)/MCL (ref 0–0.9)
EOSINOPHIL NFR BLD AUTO: 0 %
ERYTHROCYTE [DISTWIDTH] IN BLOOD BY AUTOMATED COUNT: 13.6 % (ref 11.5–17)
GFR SERPLBLD CREATININE-BSD FMLA CKD-EPI: >60 MLS/MIN/1.73/M2
GLOBULIN SER-MCNC: 3.1 GM/DL (ref 2.4–3.5)
GLUCOSE SERPL-MCNC: 113 MG/DL (ref 82–115)
HCT VFR BLD AUTO: 43.7 % (ref 42–52)
HDLC SERPL-MCNC: 55 MG/DL (ref 35–60)
HGB BLD-MCNC: 14.1 G/DL (ref 14–18)
IMM GRANULOCYTES # BLD AUTO: 0.02 X10(3)/MCL (ref 0–0.04)
IMM GRANULOCYTES NFR BLD AUTO: 0.3 %
LDLC SERPL CALC-MCNC: 100 MG/DL (ref 50–140)
LYMPHOCYTES # BLD AUTO: 2.46 X10(3)/MCL (ref 0.6–4.6)
LYMPHOCYTES NFR BLD AUTO: 36 %
MCH RBC QN AUTO: 30.4 PG (ref 27–31)
MCHC RBC AUTO-ENTMCNC: 32.3 G/DL (ref 33–36)
MCV RBC AUTO: 94.2 FL (ref 80–94)
MONOCYTES # BLD AUTO: 0.64 X10(3)/MCL (ref 0.1–1.3)
MONOCYTES NFR BLD AUTO: 9.4 %
NEUTROPHILS # BLD AUTO: 3.7 X10(3)/MCL (ref 2.1–9.2)
NEUTROPHILS NFR BLD AUTO: 54.2 %
PLATELET # BLD AUTO: 232 X10(3)/MCL (ref 130–400)
PMV BLD AUTO: 10.9 FL (ref 7.4–10.4)
POTASSIUM SERPL-SCNC: 4.2 MMOL/L (ref 3.5–5.1)
PROT SERPL-MCNC: 7.1 GM/DL (ref 5.8–7.6)
RBC # BLD AUTO: 4.64 X10(6)/MCL (ref 4.7–6.1)
SODIUM SERPL-SCNC: 141 MMOL/L (ref 136–145)
TRIGL SERPL-MCNC: 160 MG/DL (ref 34–140)
TSH SERPL-ACNC: 1.28 UIU/ML (ref 0.35–4.94)
VLDLC SERPL CALC-MCNC: 32 MG/DL
WBC # SPEC AUTO: 6.83 X10(3)/MCL (ref 4.5–11.5)

## 2023-06-26 PROCEDURE — 80053 COMPREHEN METABOLIC PANEL: CPT

## 2023-06-26 PROCEDURE — 36415 COLL VENOUS BLD VENIPUNCTURE: CPT

## 2023-06-26 PROCEDURE — 85025 COMPLETE CBC W/AUTO DIFF WBC: CPT

## 2023-06-26 PROCEDURE — 80061 LIPID PANEL: CPT

## 2023-06-26 PROCEDURE — 84443 ASSAY THYROID STIM HORMONE: CPT

## 2023-10-18 ENCOUNTER — LAB VISIT (OUTPATIENT)
Dept: LAB | Facility: HOSPITAL | Age: 68
End: 2023-10-18
Attending: FAMILY MEDICINE
Payer: COMMERCIAL

## 2023-10-18 DIAGNOSIS — J45.909 ASTHMATIC BRONCHITIS: Primary | ICD-10-CM

## 2023-10-18 LAB
BASOPHILS # BLD AUTO: 0.01 X10(3)/MCL
BASOPHILS NFR BLD AUTO: 0.2 %
EOSINOPHIL # BLD AUTO: 0 X10(3)/MCL (ref 0–0.9)
EOSINOPHIL NFR BLD AUTO: 0 %
ERYTHROCYTE [DISTWIDTH] IN BLOOD BY AUTOMATED COUNT: 13.6 % (ref 11.5–17)
HCT VFR BLD AUTO: 43.1 % (ref 42–52)
HGB BLD-MCNC: 13.9 G/DL (ref 14–18)
IMM GRANULOCYTES # BLD AUTO: 0.02 X10(3)/MCL (ref 0–0.04)
IMM GRANULOCYTES NFR BLD AUTO: 0.3 %
LYMPHOCYTES # BLD AUTO: 2.11 X10(3)/MCL (ref 0.6–4.6)
LYMPHOCYTES NFR BLD AUTO: 32.1 %
MCH RBC QN AUTO: 31 PG (ref 27–31)
MCHC RBC AUTO-ENTMCNC: 32.3 G/DL (ref 33–36)
MCV RBC AUTO: 96.2 FL (ref 80–94)
MONOCYTES # BLD AUTO: 0.67 X10(3)/MCL (ref 0.1–1.3)
MONOCYTES NFR BLD AUTO: 10.2 %
NEUTROPHILS # BLD AUTO: 3.76 X10(3)/MCL (ref 2.1–9.2)
NEUTROPHILS NFR BLD AUTO: 57.2 %
PLATELET # BLD AUTO: 206 X10(3)/MCL (ref 130–400)
PMV BLD AUTO: 11.1 FL (ref 7.4–10.4)
RBC # BLD AUTO: 4.48 X10(6)/MCL (ref 4.7–6.1)
WBC # SPEC AUTO: 6.57 X10(3)/MCL (ref 4.5–11.5)

## 2023-10-18 PROCEDURE — 85025 COMPLETE CBC W/AUTO DIFF WBC: CPT

## 2023-10-18 PROCEDURE — 36415 COLL VENOUS BLD VENIPUNCTURE: CPT

## 2023-12-08 ENCOUNTER — LAB VISIT (OUTPATIENT)
Dept: LAB | Facility: HOSPITAL | Age: 68
End: 2023-12-08
Attending: FAMILY MEDICINE
Payer: COMMERCIAL

## 2023-12-08 DIAGNOSIS — I10 ESSENTIAL HYPERTENSION, MALIGNANT: Primary | ICD-10-CM

## 2023-12-08 LAB
ALBUMIN SERPL-MCNC: 4 G/DL (ref 3.4–4.8)
ALBUMIN/GLOB SERPL: 1.3 RATIO (ref 1.1–2)
ALP SERPL-CCNC: 98 UNIT/L (ref 40–150)
ALT SERPL-CCNC: 33 UNIT/L (ref 0–55)
AST SERPL-CCNC: 29 UNIT/L (ref 5–34)
BASOPHILS # BLD AUTO: 0.01 X10(3)/MCL
BASOPHILS NFR BLD AUTO: 0.2 %
BILIRUB SERPL-MCNC: 0.5 MG/DL
BUN SERPL-MCNC: 17 MG/DL (ref 8.4–25.7)
CALCIUM SERPL-MCNC: 9.2 MG/DL (ref 8.8–10)
CHLORIDE SERPL-SCNC: 106 MMOL/L (ref 98–107)
CHOLEST SERPL-MCNC: 194 MG/DL
CHOLEST/HDLC SERPL: 3 {RATIO} (ref 0–5)
CO2 SERPL-SCNC: 27 MMOL/L (ref 23–31)
CREAT SERPL-MCNC: 0.99 MG/DL (ref 0.73–1.18)
EOSINOPHIL # BLD AUTO: 0 X10(3)/MCL (ref 0–0.9)
EOSINOPHIL NFR BLD AUTO: 0 %
ERYTHROCYTE [DISTWIDTH] IN BLOOD BY AUTOMATED COUNT: 13.5 % (ref 11.5–17)
GFR SERPLBLD CREATININE-BSD FMLA CKD-EPI: >60 MLS/MIN/1.73/M2
GLOBULIN SER-MCNC: 3.2 GM/DL (ref 2.4–3.5)
GLUCOSE SERPL-MCNC: 118 MG/DL (ref 82–115)
HCT VFR BLD AUTO: 42.9 % (ref 42–52)
HDLC SERPL-MCNC: 65 MG/DL (ref 35–60)
HGB BLD-MCNC: 14 G/DL (ref 14–18)
IMM GRANULOCYTES # BLD AUTO: 0.02 X10(3)/MCL (ref 0–0.04)
IMM GRANULOCYTES NFR BLD AUTO: 0.4 %
LDLC SERPL CALC-MCNC: 105 MG/DL (ref 50–140)
LYMPHOCYTES # BLD AUTO: 1.56 X10(3)/MCL (ref 0.6–4.6)
LYMPHOCYTES NFR BLD AUTO: 28.6 %
MCH RBC QN AUTO: 30.6 PG (ref 27–31)
MCHC RBC AUTO-ENTMCNC: 32.6 G/DL (ref 33–36)
MCV RBC AUTO: 93.9 FL (ref 80–94)
MONOCYTES # BLD AUTO: 0.5 X10(3)/MCL (ref 0.1–1.3)
MONOCYTES NFR BLD AUTO: 9.2 %
NEUTROPHILS # BLD AUTO: 3.37 X10(3)/MCL (ref 2.1–9.2)
NEUTROPHILS NFR BLD AUTO: 61.6 %
PLATELET # BLD AUTO: 225 X10(3)/MCL (ref 130–400)
PMV BLD AUTO: 10.9 FL (ref 7.4–10.4)
POTASSIUM SERPL-SCNC: 4.4 MMOL/L (ref 3.5–5.1)
PROT SERPL-MCNC: 7.2 GM/DL (ref 5.8–7.6)
RBC # BLD AUTO: 4.57 X10(6)/MCL (ref 4.7–6.1)
SODIUM SERPL-SCNC: 141 MMOL/L (ref 136–145)
TRIGL SERPL-MCNC: 121 MG/DL (ref 34–140)
TSH SERPL-ACNC: 0.69 UIU/ML (ref 0.35–4.94)
VLDLC SERPL CALC-MCNC: 24 MG/DL
WBC # SPEC AUTO: 5.46 X10(3)/MCL (ref 4.5–11.5)

## 2023-12-08 PROCEDURE — 85025 COMPLETE CBC W/AUTO DIFF WBC: CPT

## 2023-12-08 PROCEDURE — 80061 LIPID PANEL: CPT

## 2023-12-08 PROCEDURE — 84443 ASSAY THYROID STIM HORMONE: CPT

## 2023-12-08 PROCEDURE — 80053 COMPREHEN METABOLIC PANEL: CPT

## 2023-12-08 PROCEDURE — 36415 COLL VENOUS BLD VENIPUNCTURE: CPT

## 2024-12-09 ENCOUNTER — LAB VISIT (OUTPATIENT)
Dept: LAB | Facility: HOSPITAL | Age: 69
End: 2024-12-09
Attending: FAMILY MEDICINE
Payer: COMMERCIAL

## 2024-12-09 DIAGNOSIS — R73.9 BLOOD GLUCOSE ELEVATED: ICD-10-CM

## 2024-12-09 DIAGNOSIS — I10 HYPERTENSION, UNSPECIFIED TYPE: Primary | ICD-10-CM

## 2024-12-09 DIAGNOSIS — G63 GOUTY NEURITIS: ICD-10-CM

## 2024-12-09 DIAGNOSIS — M10.00 GOUTY NEURITIS: ICD-10-CM

## 2024-12-09 DIAGNOSIS — Z12.5 SPECIAL SCREENING FOR MALIGNANT NEOPLASM OF PROSTATE: ICD-10-CM

## 2024-12-09 LAB
ALBUMIN SERPL-MCNC: 3.8 G/DL (ref 3.4–4.8)
ALBUMIN/GLOB SERPL: 1.1 RATIO (ref 1.1–2)
ALP SERPL-CCNC: 103 UNIT/L (ref 40–150)
ALT SERPL-CCNC: 18 UNIT/L (ref 0–55)
ANION GAP SERPL CALC-SCNC: 8 MEQ/L
AST SERPL-CCNC: 21 UNIT/L (ref 5–34)
BASOPHILS # BLD AUTO: 0 X10(3)/MCL
BASOPHILS NFR BLD AUTO: 0 %
BILIRUB SERPL-MCNC: 0.5 MG/DL
BUN SERPL-MCNC: 19 MG/DL (ref 8.4–25.7)
CALCIUM SERPL-MCNC: 9.5 MG/DL (ref 8.8–10)
CHLORIDE SERPL-SCNC: 109 MMOL/L (ref 98–107)
CHOLEST SERPL-MCNC: 192 MG/DL
CHOLEST/HDLC SERPL: 2 {RATIO} (ref 0–5)
CO2 SERPL-SCNC: 23 MMOL/L (ref 23–31)
CREAT SERPL-MCNC: 0.93 MG/DL (ref 0.72–1.25)
CREAT/UREA NIT SERPL: 20
EOSINOPHIL # BLD AUTO: 0 X10(3)/MCL (ref 0–0.9)
EOSINOPHIL NFR BLD AUTO: 0 %
ERYTHROCYTE [DISTWIDTH] IN BLOOD BY AUTOMATED COUNT: 14 % (ref 11.5–17)
EST. AVERAGE GLUCOSE BLD GHB EST-MCNC: 122.6 MG/DL
GFR SERPLBLD CREATININE-BSD FMLA CKD-EPI: >60 ML/MIN/1.73/M2
GLOBULIN SER-MCNC: 3.4 GM/DL (ref 2.4–3.5)
GLUCOSE SERPL-MCNC: 97 MG/DL (ref 82–115)
HBA1C MFR BLD: 5.9 %
HCT VFR BLD AUTO: 44.2 % (ref 42–52)
HDLC SERPL-MCNC: 77 MG/DL (ref 35–60)
HGB BLD-MCNC: 14.5 G/DL (ref 14–18)
IMM GRANULOCYTES # BLD AUTO: 0.02 X10(3)/MCL (ref 0–0.04)
IMM GRANULOCYTES NFR BLD AUTO: 0.3 %
LDLC SERPL CALC-MCNC: 94 MG/DL (ref 50–140)
LYMPHOCYTES # BLD AUTO: 1.49 X10(3)/MCL (ref 0.6–4.6)
LYMPHOCYTES NFR BLD AUTO: 25 %
MCH RBC QN AUTO: 31 PG (ref 27–31)
MCHC RBC AUTO-ENTMCNC: 32.8 G/DL (ref 33–36)
MCV RBC AUTO: 94.4 FL (ref 80–94)
MONOCYTES # BLD AUTO: 0.56 X10(3)/MCL (ref 0.1–1.3)
MONOCYTES NFR BLD AUTO: 9.4 %
NEUTROPHILS # BLD AUTO: 3.89 X10(3)/MCL (ref 2.1–9.2)
NEUTROPHILS NFR BLD AUTO: 65.3 %
PLATELET # BLD AUTO: 242 X10(3)/MCL (ref 130–400)
PMV BLD AUTO: 10.9 FL (ref 7.4–10.4)
POTASSIUM SERPL-SCNC: 4.3 MMOL/L (ref 3.5–5.1)
PROT SERPL-MCNC: 7.2 GM/DL (ref 5.8–7.6)
PSA SERPL-MCNC: 0.97 NG/ML
RBC # BLD AUTO: 4.68 X10(6)/MCL (ref 4.7–6.1)
SODIUM SERPL-SCNC: 140 MMOL/L (ref 136–145)
TRIGL SERPL-MCNC: 104 MG/DL (ref 34–140)
TSH SERPL-ACNC: 0.91 UIU/ML (ref 0.35–4.94)
URATE SERPL-MCNC: 4.9 MG/DL (ref 3.5–7.2)
VLDLC SERPL CALC-MCNC: 21 MG/DL
WBC # BLD AUTO: 5.96 X10(3)/MCL (ref 4.5–11.5)

## 2024-12-09 PROCEDURE — 80061 LIPID PANEL: CPT

## 2024-12-09 PROCEDURE — 80053 COMPREHEN METABOLIC PANEL: CPT

## 2024-12-09 PROCEDURE — 84153 ASSAY OF PSA TOTAL: CPT

## 2024-12-09 PROCEDURE — 84443 ASSAY THYROID STIM HORMONE: CPT

## 2024-12-09 PROCEDURE — 84550 ASSAY OF BLOOD/URIC ACID: CPT

## 2024-12-09 PROCEDURE — 83036 HEMOGLOBIN GLYCOSYLATED A1C: CPT

## 2024-12-09 PROCEDURE — 36415 COLL VENOUS BLD VENIPUNCTURE: CPT

## 2024-12-09 PROCEDURE — 85025 COMPLETE CBC W/AUTO DIFF WBC: CPT
